# Patient Record
(demographics unavailable — no encounter records)

---

## 2024-11-27 NOTE — DISCUSSION/SUMMARY
[de-identified] : I discussed the patient's likely condition with her.  This is likely secondary to bursitis and metatarsalgia secondary to overuse.  Low likelihood of a stress fracture.  Nonetheless I would like to protect it with a cam boot to offload her forefoot for the next couple weeks.  She can weight-bear with a cam boot on.  She can take the boot off when she is not walking on it.  I will see her back in 3 weeks for reassessment.  All questions answered.

## 2024-11-27 NOTE — DATA REVIEWED
[FreeTextEntry1] : 3 views of the left foot ordered reviewed by me personally.  X-rays demonstrate no evidence of fracture or dislocation.

## 2024-11-27 NOTE — HISTORY OF PRESENT ILLNESS
[de-identified] : 22-year-old patient who recently ran the [employer] 3 days ago who presents with left foot pain since the marathon.  She has she fell during the marathon but denies any trauma.  Localizes her pain over the forefoot centered over the center 3 metatarsals.  Did not have any issues with the foot during training.  Has never had this type of foot pain before.  So far no formal treatment.  Has been utilizing ice along with elevation and anti-inflammatories.

## 2024-11-27 NOTE — PHYSICAL EXAM
[de-identified] : Skin intact.  No evidence of infection.  Tender over the second and third webspace and to a lesser degree the metatarsal necks.  No bony crepitus or instability.  Neurovascular intact.

## 2025-01-09 NOTE — HISTORY OF PRESENT ILLNESS
[N] : Patient reports normal menses [Oral Contraceptive] : uses oral contraception pills [Y] : Patient is sexually active [Normal Amount/Duration] :  normal amount and duration [Regular Cycle Intervals] : periods have been regular [No] : Patient does not have concerns regarding sex [Currently Active] : currently active [LMPDate] : 01/02/25 [MensesFreq] : 28 [MensesLength] : 5 [MensesAmount] : light [FreeTextEntry1] : 01/02/25

## 2025-01-09 NOTE — PLAN
Detail Level: Detailed
Quality 226: Preventive Care And Screening: Tobacco Use: Screening And Cessation Intervention: Patient screened for tobacco use and is an ex/non-smoker
Quality 130: Documentation Of Current Medications In The Medical Record: Current Medications Documented
Quality 431: Preventive Care And Screening: Unhealthy Alcohol Use - Screening: Patient not identified as an unhealthy alcohol user when screened for unhealthy alcohol use using a systematic screening method
[FreeTextEntry1] : The patient's history and presentation were reviewed and discussed with Dr. Marin. An assessment was conducted in collaboration with Dr. Marin, and the plan of care was formulated and discussed accordingly.  Pt seen and examined with NP, Ethel Clarke. The analysis and plan were agreed upon and implemented.

## 2025-01-09 NOTE — PHYSICAL EXAM
[Chaperone Present] : A chaperone was present in the examining room during all aspects of the physical examination [60799] : A chaperone was present during the pelvic exam. [Appropriately responsive] : appropriately responsive [Alert] : alert [No Acute Distress] : no acute distress [Soft] : soft [Non-tender] : non-tender [Non-distended] : non-distended [Oriented x3] : oriented x3 [Labia Majora] : normal [Labia Minora] : normal [Normal] : normal [Anteversion] : anteverted [Uterine Adnexae] : normal [FreeTextEntry2] : [facility] [place]